# Patient Record
Sex: MALE | Race: WHITE | NOT HISPANIC OR LATINO | Employment: STUDENT | URBAN - METROPOLITAN AREA
[De-identification: names, ages, dates, MRNs, and addresses within clinical notes are randomized per-mention and may not be internally consistent; named-entity substitution may affect disease eponyms.]

---

## 2024-09-20 ENCOUNTER — HOSPITAL ENCOUNTER (EMERGENCY)
Facility: HOSPITAL | Age: 34
Discharge: HOME | End: 2024-09-20
Attending: STUDENT IN AN ORGANIZED HEALTH CARE EDUCATION/TRAINING PROGRAM
Payer: COMMERCIAL

## 2024-09-20 VITALS
SYSTOLIC BLOOD PRESSURE: 136 MMHG | TEMPERATURE: 98 F | OXYGEN SATURATION: 96 % | WEIGHT: 200 LBS | BODY MASS INDEX: 33.32 KG/M2 | DIASTOLIC BLOOD PRESSURE: 93 MMHG | HEART RATE: 71 BPM | HEIGHT: 65 IN | RESPIRATION RATE: 20 BRPM

## 2024-09-20 DIAGNOSIS — J45.901 EXACERBATION OF ASTHMA, UNSPECIFIED ASTHMA SEVERITY, UNSPECIFIED WHETHER PERSISTENT: Primary | ICD-10-CM

## 2024-09-20 DIAGNOSIS — R06.02 SHORTNESS OF BREATH: ICD-10-CM

## 2024-09-20 LAB
ALBUMIN SERPL BCP-MCNC: 4.3 G/DL (ref 3.5–5.2)
ALP SERPL-CCNC: 62 U/L (ref 55–135)
ALT SERPL W/O P-5'-P-CCNC: 17 U/L (ref 10–44)
ANION GAP SERPL CALC-SCNC: 7 MMOL/L (ref 8–16)
AST SERPL-CCNC: 15 U/L (ref 10–40)
BASOPHILS # BLD AUTO: 0.06 K/UL (ref 0–0.2)
BASOPHILS NFR BLD: 0.7 % (ref 0–1.9)
BILIRUB SERPL-MCNC: 0.3 MG/DL (ref 0.1–1)
BNP SERPL-MCNC: 13 PG/ML (ref 0–99)
BUN SERPL-MCNC: 14 MG/DL (ref 6–20)
CALCIUM SERPL-MCNC: 9.2 MG/DL (ref 8.7–10.5)
CHLORIDE SERPL-SCNC: 105 MMOL/L (ref 95–110)
CO2 SERPL-SCNC: 27 MMOL/L (ref 23–29)
CREAT SERPL-MCNC: 1 MG/DL (ref 0.5–1.4)
D DIMER PPP IA.FEU-MCNC: 0.19 MG/L FEU (ref 0–0.49)
DIFFERENTIAL METHOD BLD: NORMAL
EOSINOPHIL # BLD AUTO: 0.4 K/UL (ref 0–0.5)
EOSINOPHIL NFR BLD: 4.3 % (ref 0–8)
ERYTHROCYTE [DISTWIDTH] IN BLOOD BY AUTOMATED COUNT: 13.1 % (ref 11.5–14.5)
EST. GFR  (NO RACE VARIABLE): >60 ML/MIN/1.73 M^2
GLUCOSE SERPL-MCNC: 88 MG/DL (ref 70–110)
HCT VFR BLD AUTO: 42.7 % (ref 40–54)
HGB BLD-MCNC: 14.2 G/DL (ref 14–18)
IMM GRANULOCYTES # BLD AUTO: 0.03 K/UL (ref 0–0.04)
IMM GRANULOCYTES NFR BLD AUTO: 0.4 % (ref 0–0.5)
LYMPHOCYTES # BLD AUTO: 2.3 K/UL (ref 1–4.8)
LYMPHOCYTES NFR BLD: 27.9 % (ref 18–48)
MAGNESIUM SERPL-MCNC: 1.9 MG/DL (ref 1.6–2.6)
MCH RBC QN AUTO: 28.7 PG (ref 27–31)
MCHC RBC AUTO-ENTMCNC: 33.3 G/DL (ref 32–36)
MCV RBC AUTO: 86 FL (ref 82–98)
MONOCYTES # BLD AUTO: 0.6 K/UL (ref 0.3–1)
MONOCYTES NFR BLD: 7.7 % (ref 4–15)
NEUTROPHILS # BLD AUTO: 4.8 K/UL (ref 1.8–7.7)
NEUTROPHILS NFR BLD: 59 % (ref 38–73)
NRBC BLD-RTO: 0 /100 WBC
PLATELET # BLD AUTO: 264 K/UL (ref 150–450)
PMV BLD AUTO: 10.4 FL (ref 9.2–12.9)
POTASSIUM SERPL-SCNC: 3.9 MMOL/L (ref 3.5–5.1)
PROT SERPL-MCNC: 6.7 G/DL (ref 6–8.4)
RBC # BLD AUTO: 4.95 M/UL (ref 4.6–6.2)
SARS-COV-2 RDRP RESP QL NAA+PROBE: NEGATIVE
SODIUM SERPL-SCNC: 139 MMOL/L (ref 136–145)
TROPONIN I SERPL HS-MCNC: 2.9 PG/ML (ref 0–14.9)
WBC # BLD AUTO: 8.07 K/UL (ref 3.9–12.7)

## 2024-09-20 PROCEDURE — U0002 COVID-19 LAB TEST NON-CDC: HCPCS | Performed by: EMERGENCY MEDICINE

## 2024-09-20 PROCEDURE — 93005 ELECTROCARDIOGRAM TRACING: CPT | Performed by: INTERNAL MEDICINE

## 2024-09-20 PROCEDURE — 99900031 HC PATIENT EDUCATION (STAT)

## 2024-09-20 PROCEDURE — 85379 FIBRIN DEGRADATION QUANT: CPT | Performed by: EMERGENCY MEDICINE

## 2024-09-20 PROCEDURE — 84484 ASSAY OF TROPONIN QUANT: CPT | Performed by: EMERGENCY MEDICINE

## 2024-09-20 PROCEDURE — 80053 COMPREHEN METABOLIC PANEL: CPT | Performed by: EMERGENCY MEDICINE

## 2024-09-20 PROCEDURE — 83880 ASSAY OF NATRIURETIC PEPTIDE: CPT | Performed by: EMERGENCY MEDICINE

## 2024-09-20 PROCEDURE — 25000242 PHARM REV CODE 250 ALT 637 W/ HCPCS

## 2024-09-20 PROCEDURE — 96374 THER/PROPH/DIAG INJ IV PUSH: CPT

## 2024-09-20 PROCEDURE — 94761 N-INVAS EAR/PLS OXIMETRY MLT: CPT

## 2024-09-20 PROCEDURE — 63600175 PHARM REV CODE 636 W HCPCS: Performed by: EMERGENCY MEDICINE

## 2024-09-20 PROCEDURE — 93010 ELECTROCARDIOGRAM REPORT: CPT | Mod: ,,, | Performed by: INTERNAL MEDICINE

## 2024-09-20 PROCEDURE — 25000242 PHARM REV CODE 250 ALT 637 W/ HCPCS: Performed by: EMERGENCY MEDICINE

## 2024-09-20 PROCEDURE — 85025 COMPLETE CBC W/AUTO DIFF WBC: CPT | Performed by: EMERGENCY MEDICINE

## 2024-09-20 PROCEDURE — 94640 AIRWAY INHALATION TREATMENT: CPT

## 2024-09-20 PROCEDURE — 83735 ASSAY OF MAGNESIUM: CPT | Performed by: EMERGENCY MEDICINE

## 2024-09-20 PROCEDURE — 99285 EMERGENCY DEPT VISIT HI MDM: CPT | Mod: 25

## 2024-09-20 RX ORDER — ALBUTEROL SULFATE 0.83 MG/ML
SOLUTION RESPIRATORY (INHALATION)
Status: COMPLETED
Start: 2024-09-20 | End: 2024-09-20

## 2024-09-20 RX ORDER — PREDNISONE 20 MG/1
20 TABLET ORAL DAILY
Qty: 5 TABLET | Refills: 0 | Status: SHIPPED | OUTPATIENT
Start: 2024-09-20 | End: 2024-09-25

## 2024-09-20 RX ORDER — IPRATROPIUM BROMIDE AND ALBUTEROL SULFATE 2.5; .5 MG/3ML; MG/3ML
3 SOLUTION RESPIRATORY (INHALATION)
Status: COMPLETED | OUTPATIENT
Start: 2024-09-20 | End: 2024-09-20

## 2024-09-20 RX ORDER — METHYLPREDNISOLONE SOD SUCC 125 MG
125 VIAL (EA) INJECTION
Status: COMPLETED | OUTPATIENT
Start: 2024-09-20 | End: 2024-09-20

## 2024-09-20 RX ORDER — ALBUTEROL SULFATE 90 UG/1
2 INHALANT RESPIRATORY (INHALATION) EVERY 6 HOURS PRN
Qty: 18 G | Refills: 0 | Status: SHIPPED | OUTPATIENT
Start: 2024-09-20 | End: 2025-09-20

## 2024-09-20 RX ADMIN — ALBUTEROL SULFATE 2.5 MG: 2.5 SOLUTION RESPIRATORY (INHALATION) at 08:09

## 2024-09-20 RX ADMIN — IPRATROPIUM BROMIDE AND ALBUTEROL SULFATE 3 ML: 2.5; .5 SOLUTION RESPIRATORY (INHALATION) at 09:09

## 2024-09-20 RX ADMIN — METHYLPREDNISOLONE SODIUM SUCCINATE 125 MG: 125 INJECTION, POWDER, FOR SOLUTION INTRAMUSCULAR; INTRAVENOUS at 09:09

## 2024-09-20 NOTE — DISCHARGE INSTRUCTIONS
Take oral steroids as directed until all gone   Please use inhaler as directed   Please refrain from smoking   Please see your primary care provider on Monday for recheck and blood pressure rechecked   Return if condition becomes worse for any concerns

## 2024-09-20 NOTE — Clinical Note
"Pérez Tompkinss" Raymond was seen and treated in our emergency department on 9/20/2024.  He may return to work on 09/22/2024.       If you have any questions or concerns, please don't hesitate to call.      Becky Sanchez, ISELA"

## 2024-09-20 NOTE — PLAN OF CARE
09/20/24 0845   Patient Assessment/Suction   Level of Consciousness (AVPU) alert   Respiratory Effort Short of breath   Expansion/Accessory Muscles/Retractions accessory muscle use   All Lung Fields Breath Sounds wheezes, expiratory;wheezes, inspiratory   Rhythm/Pattern, Respiratory shortness of breath   Cough Frequency frequent   Cough Type nonproductive   PRE-TX-O2   Device (Oxygen Therapy) room air   SpO2 97 %   Pulse Oximetry Type Continuous   $ Pulse Oximetry - Multiple Charge Pulse Oximetry - Multiple   Pulse 96   Resp (!) 22   Aerosol Therapy   $ Aerosol Therapy Charges Aerosol Treatment  (albuterol)   Daily Review of Necessity (SVN) completed   Respiratory Treatment Status (SVN) given   Treatment Route (SVN) mouthpiece utilized;air   Patient Position sitting on edge of bed   Post Treatment Assessment (SVN) patient reports breathing improved   Signs of Intolerance (SVN) none   Breath Sounds Post-Respiratory Treatment   Throughout All Fields Post-Treatment All Fields   Throughout All Fields Post-Treatment aeration increased   Post-treatment Heart Rate (beats/min) 106   Post-treatment Resp Rate (breaths/min) 22   Equipment Change   $ RT Equipment Treatment nebulizer   Respiratory Interventions   Cough And Deep Breathing done with encouragement   Education   $ Education Bronchodilator;15 min

## 2024-09-20 NOTE — ED PROVIDER NOTES
Encounter Date: 9/20/2024       History     Chief Complaint   Patient presents with    Asthma     States asthma acting up out of inhaler    Shortness of Breath     34-year-old male with a past medical history of asthma presents emergency department with complaint of cough that started last p.m., pain with breathing, shortness of breath and wheezing.  Patient denies any associated fever.  He does smoke approximately 1 pack of cigarettes a day.  Patient denies any known ill contacts.    The history is provided by the patient.     Review of patient's allergies indicates:  No Known Allergies  Past Medical History:   Diagnosis Date    Anxiety     Bipolar 1 disorder     Depression      History reviewed. No pertinent surgical history.  No family history on file.  Social History     Tobacco Use    Smoking status: Every Day     Current packs/day: 1.00     Types: Cigarettes   Substance Use Topics    Alcohol use: No    Drug use: Yes     Types: Marijuana     Review of Systems   Constitutional:  Negative for chills and fever.   HENT: Negative.     Respiratory:  Positive for cough, shortness of breath and wheezing.    Cardiovascular: Negative.  Negative for chest pain, palpitations and leg swelling.   Gastrointestinal: Negative.    Genitourinary: Negative.    Musculoskeletal: Negative.    Neurological: Negative.    Hematological: Negative.    Psychiatric/Behavioral: Negative.     All other systems reviewed and are negative.      Physical Exam     Initial Vitals [09/20/24 0829]   BP Pulse Resp Temp SpO2   (!) 147/103 90 18 97.8 °F (36.6 °C) 98 %      MAP       --         Physical Exam    Nursing note and vitals reviewed.  Constitutional: He appears well-developed and well-nourished.   HENT:   Head: Normocephalic and atraumatic.   Right Ear: External ear normal.   Left Ear: External ear normal.   Eyes: Conjunctivae and EOM are normal. Pupils are equal, round, and reactive to light.   Neck: Neck supple.   Normal range of  motion.  Cardiovascular:  Normal rate, regular rhythm, normal heart sounds and intact distal pulses.           Pulmonary/Chest: No respiratory distress. He has wheezes. He has no rhonchi. He has no rales. He exhibits no tenderness.   Abdominal: Abdomen is soft. Bowel sounds are normal. He exhibits no distension. There is no abdominal tenderness.   Musculoskeletal:      Cervical back: Normal range of motion and neck supple.     Neurological: He is alert and oriented to person, place, and time. He has normal strength. GCS score is 15. GCS eye subscore is 4. GCS verbal subscore is 5. GCS motor subscore is 6.   Skin: Skin is warm. No rash noted.   Psychiatric: He has a normal mood and affect.         ED Course   Procedures  Labs Reviewed   COMPREHENSIVE METABOLIC PANEL - Abnormal       Result Value    Sodium 139      Potassium 3.9      Chloride 105      CO2 27      Glucose 88      BUN 14      Creatinine 1.0      Calcium 9.2      Total Protein 6.7      Albumin 4.3      Total Bilirubin 0.3      Alkaline Phosphatase 62      AST 15      ALT 17      eGFR >60.0      Anion Gap 7 (*)    CBC W/ AUTO DIFFERENTIAL    WBC 8.07      RBC 4.95      Hemoglobin 14.2      Hematocrit 42.7      MCV 86      MCH 28.7      MCHC 33.3      RDW 13.1      Platelets 264      MPV 10.4      Immature Granulocytes 0.4      Gran # (ANC) 4.8      Immature Grans (Abs) 0.03      Lymph # 2.3      Mono # 0.6      Eos # 0.4      Baso # 0.06      nRBC 0      Gran % 59.0      Lymph % 27.9      Mono % 7.7      Eosinophil % 4.3      Basophil % 0.7      Differential Method Automated     TROPONIN I HIGH SENSITIVITY    Troponin I High Sensitivity 2.9     B-TYPE NATRIURETIC PEPTIDE    BNP 13     MAGNESIUM    Magnesium 1.9     SARS-COV-2 RNA AMPLIFICATION, QUAL    SARS-CoV-2 RNA, Amplification, Qual Negative     D DIMER, QUANTITATIVE    D-Dimer 0.19          ECG Results              EKG 12-lead (In process)        Collection Time Result Time QRS Duration OHS QTC  Calculation    09/20/24 09:14:51 09/20/24 09:53:23 80 385                     In process by Interface, [facility] (09/20/24 09:53:28)                   Narrative:    Test Reason : R06.02,    Vent. Rate : 085 BPM     Atrial Rate : 085 BPM     P-R Int : 168 ms          QRS Dur : 080 ms      QT Int : 324 ms       P-R-T Axes : 069 061 030 degrees     QTc Int : 385 ms    Normal sinus rhythm with sinus arrhythmia  Normal ECG  No previous ECGs available    Referred By:             Confirmed By:                       In process by Interface, [facility] (09/20/24 09:39:15)                   Narrative:    Test Reason : R06.02,    Vent. Rate : 085 BPM     Atrial Rate : 085 BPM     P-R Int : 168 ms          QRS Dur : 080 ms      QT Int : 324 ms       P-R-T Axes : 069 061 030 degrees     QTc Int : 385 ms    Normal sinus rhythm with sinus arrhythmia  Normal ECG  No previous ECGs available    Referred By:             Confirmed By:                                   Imaging Results              X-Ray Chest AP Portable (Final result)  Result time 09/20/24 09:27:20      Final result by Mani Griffin MD (09/20/24 09:27:20)                   Impression:      No acute cardiac or pulmonary process.      Electronically signed by: Mani Griffin  Date:    09/20/2024  Time:    09:27               Narrative:    CLINICAL HISTORY:  ([id number] y/o  (1990) M    CHF;    TECHNIQUE:  (A#[id number], exam time 9/20/2024 9:26)    XR CHEST AP PORTABLE [id number]    COMPARISON:  None available.    FINDINGS:  The lungs are clear. Costophrenic angles are seen without effusion. No pneumothorax is identified. The heart is normal in size. The mediastinum is within normal limits. Osseous structures appear within normal limits. The visualized upper abdomen is unremarkable.                                       Medications   albuterol (PROVENTIL) 2.5 mg /3 mL (0.083 %) nebulizer solution (2.5 mg  Given 9/20/24 0845)   albuterol-ipratropium 2.5  mg-0.5 mg/3 mL nebulizer solution 3 mL (3 mLs Nebulization Given 9/20/24 0921)   methylPREDNISolone sodium succinate injection 125 mg (125 mg Intravenous Given 9/20/24 0924)     Medical Decision Making  34-year-old male with a past medical history of asthma presents emergency department with complaint of cough that started last p.m., pain with breathing, shortness of breath and wheezing.  Patient denies any associated fever.  He does smoke approximately 1 pack of cigarettes a day.  Patient denies any known ill contacts.    The history is provided by the patient.     Considerations include but not limited to, asthma exacerbation, pneumonia, COVID, CHF exacerbation, pleurisy, PE, ACS    34-year-old male presents emergency department has a history of asthma currently out of his inhaler reports he has had a nonproductive cough with wheezing and associated shortness of breath that started last p.m..  Patient has had no associated fevers.  The patient does smoke a proximally 1 pack of cigarettes a day.  He was noted to have an elevated blood pressure on physical exam states he has no history of hypertension.  Patient has not seen his primary care provider in over 1 year.  Denies any headache or blurry vision, patient's only complaint is shortness of breath with wheezing reports symptoms similar to when he had asthma exacerbation.  Patient was given 2 breathing treatments, and IV steroids with resolution of symptoms and reports that he feels well.  Chest x-ray reveals no consolidated pneumonia D-dimer is normal, wells criteria negative PE less likely do not appreciate the patient requiring any further emergent imaging.  COVID and influenza testing are negative.  Patient's labs unremarkable including BNP and no evidence of pleural effusion on x-ray making CHF less likely.  Patient's EKG reveals normal sinus rhythm with a rate of 85 with no ST elevation or ischemic changes high sensitivity troponin is normal although ACS  differential I think this is less likely.  Patient will be discharged home with 5 days of oral steroids and inhaler.  He was given return precautions he was instructed follow up with the primary care provider on Monday for blood pressure check he was given return precautions for the ER.    Amount and/or Complexity of Data Reviewed  Labs: ordered. Decision-making details documented in ED Course.  Radiology: ordered. Decision-making details documented in ED Course.  ECG/medicine tests: ordered. Decision-making details documented in ED Course.    Risk  Prescription drug management.      Additional MDM:     Well's Criteria Score:  -Clinical symptoms of DVT (leg swelling, pain with palpation) = 0.0  -PE is #1 diagnosis OR equally likely =            0.0  -Heart Rate >100 =   0.0  -Immobilization (= or > than 3 days) or surgery in the previous 4 weeks = 0.0  -Previous DVT/PE = 0.0  -Hemoptysis =          0.0  -Malignancy =           0.0  Well's Probability Score =    0                                     Clinical Impression:  Final diagnoses:  [R06.02] Shortness of breath  [J45.901] Exacerbation of asthma, unspecified asthma severity, unspecified whether persistent (Primary)          ED Disposition Condition    Discharge Stable          ED Prescriptions       Medication Sig Dispense Start Date End Date Auth. Provider    predniSONE (DELTASONE) 20 MG tablet Take 1 tablet (20 mg total) by mouth once daily. for 5 days 5 tablet 9/20/2024 9/25/2024 Becky Sanchez FNP    albuterol (PROAIR HFA) 90 mcg/actuation inhaler Inhale 2 puffs into the lungs every 6 (six) hours as needed for Wheezing. Rescue 18 g 9/20/2024 9/20/2025 Becky Sanchez FNP          Follow-up Information    None          Becky Sanchez FNP  09/20/24 [id number]

## 2024-09-21 LAB
OHS QRS DURATION: 80 MS
OHS QTC CALCULATION: 385 MS